# Patient Record
Sex: MALE | Race: WHITE | NOT HISPANIC OR LATINO
[De-identification: names, ages, dates, MRNs, and addresses within clinical notes are randomized per-mention and may not be internally consistent; named-entity substitution may affect disease eponyms.]

---

## 2021-05-12 PROBLEM — Z00.00 ENCOUNTER FOR PREVENTIVE HEALTH EXAMINATION: Status: ACTIVE | Noted: 2021-05-12

## 2021-05-17 ENCOUNTER — APPOINTMENT (OUTPATIENT)
Dept: PULMONOLOGY | Facility: CLINIC | Age: 71
End: 2021-05-17

## 2021-05-17 ENCOUNTER — APPOINTMENT (OUTPATIENT)
Dept: PULMONOLOGY | Facility: CLINIC | Age: 71
End: 2021-05-17
Payer: MEDICARE

## 2021-05-17 VITALS
TEMPERATURE: 96.6 F | SYSTOLIC BLOOD PRESSURE: 110 MMHG | DIASTOLIC BLOOD PRESSURE: 70 MMHG | HEART RATE: 82 BPM | OXYGEN SATURATION: 99 % | HEIGHT: 74 IN | WEIGHT: 165 LBS | BODY MASS INDEX: 21.17 KG/M2

## 2021-05-17 DIAGNOSIS — I10 ESSENTIAL (PRIMARY) HYPERTENSION: ICD-10-CM

## 2021-05-17 DIAGNOSIS — E78.5 HYPERLIPIDEMIA, UNSPECIFIED: ICD-10-CM

## 2021-05-17 DIAGNOSIS — F41.9 ANXIETY DISORDER, UNSPECIFIED: ICD-10-CM

## 2021-05-17 DIAGNOSIS — G47.00 INSOMNIA, UNSPECIFIED: ICD-10-CM

## 2021-05-17 DIAGNOSIS — Z86.711 PERSONAL HISTORY OF PULMONARY EMBOLISM: ICD-10-CM

## 2021-05-17 DIAGNOSIS — F32.9 MAJOR DEPRESSIVE DISORDER, SINGLE EPISODE, UNSPECIFIED: ICD-10-CM

## 2021-05-17 DIAGNOSIS — M81.0 AGE-RELATED OSTEOPOROSIS W/OUT CURRENT PATHOLOGICAL FRACTURE: ICD-10-CM

## 2021-05-17 DIAGNOSIS — E55.9 VITAMIN D DEFICIENCY, UNSPECIFIED: ICD-10-CM

## 2021-05-17 DIAGNOSIS — Z87.891 PERSONAL HISTORY OF NICOTINE DEPENDENCE: ICD-10-CM

## 2021-05-17 PROCEDURE — 99204 OFFICE O/P NEW MOD 45 MIN: CPT

## 2021-05-17 PROCEDURE — 99072 ADDL SUPL MATRL&STAF TM PHE: CPT

## 2021-05-17 RX ORDER — ZOLPIDEM TARTRATE 10 MG/1
10 TABLET ORAL
Qty: 30 | Refills: 0 | Status: ACTIVE | COMMUNITY
Start: 2021-05-04

## 2021-05-17 RX ORDER — ATORVASTATIN CALCIUM 10 MG/1
10 TABLET, FILM COATED ORAL
Qty: 90 | Refills: 0 | Status: ACTIVE | COMMUNITY
Start: 2021-05-13

## 2021-05-17 RX ORDER — CYCLOSPORINE 0.5 MG/ML
0.05 EMULSION OPHTHALMIC
Qty: 180 | Refills: 0 | Status: ACTIVE | COMMUNITY
Start: 2021-04-08

## 2021-05-17 RX ORDER — APIXABAN 5 MG (74)
5 KIT ORAL
Qty: 74 | Refills: 0 | Status: ACTIVE | COMMUNITY
Start: 2021-01-14

## 2021-05-17 RX ORDER — APIXABAN 5 MG/1
5 TABLET, FILM COATED ORAL
Qty: 180 | Refills: 0 | Status: ACTIVE | COMMUNITY
Start: 2021-03-26

## 2021-05-17 RX ORDER — CLONAZEPAM 0.5 MG/1
0.5 TABLET ORAL
Qty: 90 | Refills: 0 | Status: ACTIVE | COMMUNITY
Start: 2021-03-30

## 2021-05-17 RX ORDER — PHENAZOPYRIDINE HYDROCHLORIDE 200 MG/1
200 TABLET ORAL
Qty: 30 | Refills: 0 | Status: ACTIVE | COMMUNITY
Start: 2020-12-28

## 2021-05-17 RX ORDER — CLONAZEPAM 1 MG/1
1 TABLET ORAL
Qty: 30 | Refills: 0 | Status: ACTIVE | COMMUNITY
Start: 2020-12-21

## 2021-05-17 NOTE — PHYSICAL EXAM
[No Acute Distress] : no acute distress [Well Nourished] : well nourished [Well Developed] : well developed [Well-Appearing] : well-appearing [Normal Sclera/Conjunctiva] : normal sclera/conjunctiva [PERRL] : pupils equal round and reactive to light [EOMI] : extraocular movements intact [Normal Outer Ear/Nose] : the outer ears and nose were normal in appearance [No JVD] : no jugular venous distention [No Lymphadenopathy] : no lymphadenopathy [Supple] : supple [Thyroid Normal, No Nodules] : the thyroid was normal and there were no nodules present [No Respiratory Distress] : no respiratory distress  [No Accessory Muscle Use] : no accessory muscle use [Clear to Auscultation] : lungs were clear to auscultation bilaterally [Normal Rate] : normal rate  [Regular Rhythm] : with a regular rhythm [Normal S1, S2] : normal S1 and S2 [No Murmur] : no murmur heard [No Carotid Bruits] : no carotid bruits [No Abdominal Bruit] : a ~M bruit was not heard ~T in the abdomen [No Varicosities] : no varicosities [Pedal Pulses Present] : the pedal pulses are present [No Edema] : there was no peripheral edema [No Palpable Aorta] : no palpable aorta [No Extremity Clubbing/Cyanosis] : no extremity clubbing/cyanosis [Soft] : abdomen soft [Non Tender] : non-tender [Non-distended] : non-distended [No Masses] : no abdominal mass palpated [No HSM] : no HSM [Normal Bowel Sounds] : normal bowel sounds [Normal Posterior Cervical Nodes] : no posterior cervical lymphadenopathy [Normal Anterior Cervical Nodes] : no anterior cervical lymphadenopathy [No CVA Tenderness] : no CVA  tenderness [No Spinal Tenderness] : no spinal tenderness [No Joint Swelling] : no joint swelling [Grossly Normal Strength/Tone] : grossly normal strength/tone [No Rash] : no rash [Coordination Grossly Intact] : coordination grossly intact [No Focal Deficits] : no focal deficits [Normal Gait] : normal gait [Normal Affect] : the affect was normal [Normal Insight/Judgement] : insight and judgment were intact [de-identified] : mallampati 1

## 2021-05-17 NOTE — ASSESSMENT
[FreeTextEntry1] : Patient with chronic sleep onset and sleep maintenance maintenance insomnia. I feel this patient would benefit from sleep restriction therapy. I've advised him to go to bed later at approximately 11 PM and start his stay in the later than 6 AM. I have discussed cognitive behavioral therapy methods with him if he wakes up in the middle of the night. I've given him a referral to a cognitive behavioral therapist who deals with insomnia. I have asked patient to call me in 2 weeks for followup.

## 2021-05-17 NOTE — HISTORY OF PRESENT ILLNESS
[FreeTextEntry1] : Evaluation for insomnia. [de-identified] : This is a 70-year-old male who complains of insomnia going on for at least 2 years. He previously had a very enlarged prostate and was waking up every hour to urinate. He had a urolift procedure in December of 2020. He states this did help with the urinary problem. However he still goes to bed at about 9 PM and will fall asleep at 10. He awakens at 12, 2, 4 AM. He frequently has trouble falling back to sleep. He'll often does not fall back to sleep after 4 AM. He will go downstairs if he is unable to sleep after 4 in the morning. He usually starts his day about 7 AM. There is no snoring. He had a sleep study in 2016 which revealed an AHI of 7 with increased respiratory events in rem. He is followed by a psychiatrist for depression and anxiety. He is on Zoloft 150 mg and Klonopin 0.25 t.i.d. He uses Ambien 10 mg every other night alternating with Tylenol PM. He states he feels Tylenol PM helps him more than Ambien. He does not feel excessively sleepy when he awakens in the morning. During the day he he's complains of decreasing stamina headache and feeling jittery. However his [place] sleepiness score is one. He does not nap during the day but feels very sleepy when he tries to go to bed at 9 PM. Past medical history significant for prior pulmonary emboli, hyperlipidemia, anxiety and depression.

## 2021-05-19 ENCOUNTER — APPOINTMENT (OUTPATIENT)
Dept: PULMONOLOGY | Facility: CLINIC | Age: 71
End: 2021-05-19
Payer: MEDICARE

## 2021-05-19 VITALS
HEART RATE: 82 BPM | DIASTOLIC BLOOD PRESSURE: 70 MMHG | BODY MASS INDEX: 21.17 KG/M2 | HEIGHT: 74 IN | SYSTOLIC BLOOD PRESSURE: 110 MMHG | WEIGHT: 165 LBS

## 2021-05-19 PROCEDURE — 99072 ADDL SUPL MATRL&STAF TM PHE: CPT

## 2021-05-19 PROCEDURE — 99203 OFFICE O/P NEW LOW 30 MIN: CPT

## 2021-05-19 RX ORDER — LEVOFLOXACIN 500 MG/1
500 TABLET, FILM COATED ORAL
Qty: 5 | Refills: 0 | Status: DISCONTINUED | COMMUNITY
Start: 2020-12-28 | End: 2021-05-19

## 2021-05-19 RX ORDER — GABAPENTIN 100 MG/1
100 CAPSULE ORAL
Qty: 90 | Refills: 0 | Status: DISCONTINUED | COMMUNITY
Start: 2021-03-24 | End: 2021-05-19

## 2021-05-19 RX ORDER — ENOXAPARIN SODIUM 100 MG/ML
80 INJECTION SUBCUTANEOUS
Qty: 11 | Refills: 0 | Status: DISCONTINUED | COMMUNITY
Start: 2021-05-04 | End: 2021-05-19

## 2021-05-19 RX ORDER — OXYBUTYNIN CHLORIDE 10 MG/1
10 TABLET, EXTENDED RELEASE ORAL
Qty: 90 | Refills: 0 | Status: DISCONTINUED | COMMUNITY
Start: 2020-11-16 | End: 2021-05-19

## 2021-05-19 RX ORDER — OLANZAPINE 2.5 MG/1
2.5 TABLET, FILM COATED ORAL
Qty: 30 | Refills: 0 | Status: DISCONTINUED | COMMUNITY
Start: 2021-02-08 | End: 2021-05-19

## 2021-05-19 RX ORDER — TRAZODONE HYDROCHLORIDE 50 MG/1
50 TABLET ORAL
Qty: 30 | Refills: 0 | Status: DISCONTINUED | COMMUNITY
Start: 2020-12-16 | End: 2021-05-19

## 2021-05-19 RX ORDER — TAMSULOSIN HYDROCHLORIDE 0.4 MG/1
0.4 CAPSULE ORAL
Qty: 90 | Refills: 0 | Status: DISCONTINUED | COMMUNITY
Start: 2020-11-16 | End: 2021-05-19

## 2021-05-19 NOTE — ASSESSMENT
[FreeTextEntry1] : 70-year-old man with chronic insomnia, patient has anxiety as underlying problem which is also playing a part in his insomnia.\par \par We talked about different medication choices today as even with Ambien and Tylenol every other day along with Klonopin patient still has problems staying asleep.\par \par I suggested Lunesta, Belsomra or Dayvigo to help with his staying asleep problem.\par \par Patient is hesitant to do on any medications in long-term,  I suggested that he can try cognitive behavioral therapy to help with insomnia.  We discussed about sleep hygiene habits today.\par \par If he wants to try one of the medications, I suggested him to call my office.

## 2021-05-19 NOTE — HISTORY OF PRESENT ILLNESS
[FreeTextEntry1] : Dr. Poole\par Dr. 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 He has headaches and jittery feeling during the day, patient feels this is secondary to insomnia.  Patient is sleepy with [place] sleepiness score of 12.  Patient does not have snoring, does not have any witnessed apneas.  Patient's bedtime is around 10 PM wakes up in the morning around 6.30 AM. Wakes up multiple times, he is able to fall back to sleep some times. He feels rested when he wakes up.  He gets tired later in the day and remains tired. Patient drinks 1 cup of coffee during the daytime. Patient also has headaches or nocturia.  He is not sleepy while driving.\par Patient has chronic insomnia since october 2020.\par \par Patient exercises earlier in the day.\par He eats dinner 6 at night and he is not on electronics closer to the bedtime.  He watches some TV in the living room. \par \par There is no alcohol use to explain the insomnia.\par \par Patient tried gabapentin, zyprexa, melatonin, ambien, tylenol - all these medications did not help with the insomnia.\par \par He did a sleep study in 2016 which showed mild sleep apnea with ahi of 7.\par \par \par

## 2021-05-19 NOTE — PHYSICAL EXAM
[General Appearance - Well Developed] : well developed [Heart Sounds] : normal S1 and S2 [Murmurs] : no murmurs [] : no respiratory distress [Auscultation Breath Sounds / Voice Sounds] : lungs were clear to auscultation bilaterally [No Focal Deficits] : no focal deficits [Oriented To Time, Place, And Person] : oriented to person, place, and time [Memory Recent] : recent memory was not impaired [FreeTextEntry1] : no edema

## 2021-07-30 ENCOUNTER — NON-APPOINTMENT (OUTPATIENT)
Age: 71
End: 2021-07-30

## 2021-08-31 ENCOUNTER — APPOINTMENT (OUTPATIENT)
Dept: NEUROLOGY | Facility: CLINIC | Age: 71
End: 2021-08-31
Payer: MEDICARE

## 2021-08-31 VITALS
SYSTOLIC BLOOD PRESSURE: 111 MMHG | DIASTOLIC BLOOD PRESSURE: 69 MMHG | TEMPERATURE: 97.8 F | BODY MASS INDEX: 22.53 KG/M2 | HEART RATE: 76 BPM | WEIGHT: 170 LBS | HEIGHT: 73 IN

## 2021-08-31 DIAGNOSIS — F51.04 PSYCHOPHYSIOLOGIC INSOMNIA: ICD-10-CM

## 2021-08-31 DIAGNOSIS — G43.109 MIGRAINE WITH AURA, NOT INTRACTABLE, W/OUT STATUS MIGRAINOSUS: ICD-10-CM

## 2021-08-31 PROCEDURE — 99204 OFFICE O/P NEW MOD 45 MIN: CPT

## 2021-08-31 RX ORDER — SERTRALINE HYDROCHLORIDE 50 MG/1
50 TABLET, FILM COATED ORAL
Qty: 45 | Refills: 0 | Status: DISCONTINUED | COMMUNITY
Start: 2021-01-17 | End: 2021-08-31

## 2021-08-31 RX ORDER — SERTRALINE HYDROCHLORIDE 100 MG/1
100 TABLET, FILM COATED ORAL
Qty: 45 | Refills: 0 | Status: DISCONTINUED | COMMUNITY
Start: 2021-05-03 | End: 2021-08-31

## 2021-08-31 RX ORDER — MIRABEGRON 50 MG/1
50 TABLET, FILM COATED, EXTENDED RELEASE ORAL
Refills: 0 | Status: ACTIVE | COMMUNITY

## 2021-08-31 RX ORDER — ALENDRONATE SODIUM 70 MG/1
70 TABLET ORAL
Refills: 0 | Status: ACTIVE | COMMUNITY

## 2021-08-31 RX ORDER — SODIUM SULFATE, POTASSIUM SULFATE, MAGNESIUM SULFATE 17.5; 3.13; 1.6 G/ML; G/ML; G/ML
17.5-3.13-1.6 SOLUTION, CONCENTRATE ORAL
Qty: 354 | Refills: 0 | Status: DISCONTINUED | COMMUNITY
Start: 2021-05-04 | End: 2021-08-31

## 2021-08-31 RX ORDER — SERTRALINE 25 MG/1
25 TABLET, FILM COATED ORAL
Qty: 30 | Refills: 0 | Status: DISCONTINUED | COMMUNITY
Start: 2021-05-14 | End: 2021-08-31

## 2021-08-31 RX ORDER — ESZOPICLONE 2 MG/1
2 TABLET, FILM COATED ORAL
Qty: 10 | Refills: 0 | Status: DISCONTINUED | COMMUNITY
Start: 2021-07-28 | End: 2021-08-31

## 2021-08-31 RX ORDER — TOPIRAMATE 50 MG/1
50 TABLET, COATED ORAL
Refills: 0 | Status: ACTIVE | COMMUNITY

## 2021-08-31 NOTE — PHYSICAL EXAM
[FreeTextEntry1] : Physical examination \par General: No acute distress, Awake, Alert. \par Fundus: Unable\par Neck: no Carotid bruit. \par Cardiovascular: Normal rate, Regular rhythm, No murmur. \par Chest - clear bilaterally\par \par Mental status \par Awake, alert, and oriented to person, time and place, Normal attention span and concentration, Recent and remote memory intact, Language intact, Fund of knowledge intact. \par Cranial Nerves \par II: VFF \par III, IV, VI: PERRL, EOMI. \par V: Facial sensation is normal B/L. \par VII: Facial strength is normal B/L. \par VIII: Gross hearing is intact. \par IX, X: Palate is midline and elevates symmetrically. \par XI: Trapezius normal strength. \par XII: Tongue midline without atrophy or fasciculations. \par \par Motor exam \par Muscle tone - no evidence of rigidity or resistance in all 4 extremities. \par No atrophy or fasciculations \par Muscle Strength: arms and legs, proximal and distal flexors and extensors are normal \par No UE drift.\par \par Reflexes \par All present, normal, and symmetrical. \par Plantars right: mute. \par Plantars left: mute. \par \par Coordination \par Finger to nose: Normal. \par Heel to shin: Normal. \par \par Gait \par Normal\par

## 2021-08-31 NOTE — HISTORY OF PRESENT ILLNESS
[FreeTextEntry1] : This is a 70-year-old man who is being seen in neurologic consultation for evaluation of headaches.  In May, 2021 he developed a bifrontal headache which has not gone away.  It is associated with significant light and sound sensitivity.  He describes it as a constant burning.  It varies in intensity throughout the day.  He also notices an aura which she describes as a kaleidoscope of colors in his peripheral vision.  The headaches are intense and present all day and all night.  They are debilitating.  Every morning he does 30 minutes of floor exercise and then goes and sits in his lounge chair.\par \par Of note, patient has a history of depression.  He goes through episodes where he is severely depressed for a period of time.  The last time this happened was 6 years ago.  He was placed on Effexor.  When Effexor reached 225 mg he developed severe headaches.  After this was weaned off, his headaches resolved completely.  At that time he underwent MRI of the brain, angiogram of the brain and venogram of the brain all which were normal.  \par \par In October, 2020 he had another episode of severe depression.  He was placed on Zoloft.  He developed significant headaches on this.  He has weaned himself off Zoloft.  His last dose was on July 13.  His headaches have not improved.\par \par He feels that his depression is stable.  He is more depressed because of the debilitating nature of his headaches.\par \par He has seen Dr. Villatoro who placed him on topiramate.  He is currently taking 50 mg twice daily.  He has not had any work-up yet.

## 2021-08-31 NOTE — ASSESSMENT
[FreeTextEntry1] : Neurologic examination is normal.\par He should get imaging in any case.\par \par He is having chronic migraine with aura.  I recommend that he increase his Topamax to 75 mg twice a day.  I would actually consider the addition of duloxetine or nortriptyline to his current regimen.  The tricyclic antidepressant may be a great idea as it is more sedating and may help his severe insomnia.\par \par He should maintain 2 L of water.  \par Keep a headache diary.\par \par For breakthrough pain, I would like him to try Nurtec.\par \par He would be a great candidate for a CGRP antagonist medication.  Advised him to keep 1 neurologist in follow-up as deemed appropriate.

## 2021-08-31 NOTE — REVIEW OF SYSTEMS
[Dizziness] : dizziness [Migraine Headache] : migraine headaches [Negative] : Heme/Lymph [FreeTextEntry7] : Nausea

## 2021-08-31 NOTE — REASON FOR VISIT
[Consultation] : a consultation visit [Spouse] : spouse [FreeTextEntry1] : Pt is here today of complaints of headaches that began in may 2021. He states they are nonstop and are accompanied with nausea and dizziness at times.

## 2021-09-02 RX ORDER — RIMEGEPANT SULFATE 75 MG/75MG
75 TABLET, ORALLY DISINTEGRATING ORAL
Qty: 16 | Refills: 3 | Status: ACTIVE | COMMUNITY
Start: 2021-08-31 | End: 1900-01-01

## 2021-10-06 PROBLEM — I10 ESSENTIAL HYPERTENSION: Status: ACTIVE | Noted: 2021-05-17

## 2024-06-01 ENCOUNTER — HOSPITAL ENCOUNTER (EMERGENCY)
Dept: HOSPITAL 74 - FER | Age: 74
Discharge: HOME | End: 2024-06-01
Payer: COMMERCIAL

## 2024-06-01 VITALS
TEMPERATURE: 98.5 F | SYSTOLIC BLOOD PRESSURE: 115 MMHG | RESPIRATION RATE: 18 BRPM | DIASTOLIC BLOOD PRESSURE: 74 MMHG | HEART RATE: 91 BPM

## 2024-06-01 VITALS — BODY MASS INDEX: 22.4 KG/M2

## 2024-06-01 DIAGNOSIS — Z79.01: ICD-10-CM
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
## 2024-06-01 DIAGNOSIS — S01.01XA: Primary | ICD-10-CM

## 2024-06-01 PROCEDURE — 0HQ0XZZ REPAIR SCALP SKIN, EXTERNAL APPROACH: ICD-10-PCS | Performed by: STUDENT IN AN ORGANIZED HEALTH CARE EDUCATION/TRAINING PROGRAM

## 2024-06-07 ENCOUNTER — HOSPITAL ENCOUNTER (EMERGENCY)
Dept: HOSPITAL 74 - FER | Age: 74
Discharge: HOME | End: 2024-06-07
Payer: COMMERCIAL

## 2024-06-07 VITALS
TEMPERATURE: 98.2 F | SYSTOLIC BLOOD PRESSURE: 114 MMHG | HEART RATE: 72 BPM | DIASTOLIC BLOOD PRESSURE: 70 MMHG | RESPIRATION RATE: 20 BRPM

## 2024-06-07 VITALS — BODY MASS INDEX: 28.2 KG/M2

## 2024-06-07 DIAGNOSIS — Z48.02: Primary | ICD-10-CM
